# Patient Record
Sex: MALE | Race: WHITE | Employment: FULL TIME | URBAN - METROPOLITAN AREA
[De-identification: names, ages, dates, MRNs, and addresses within clinical notes are randomized per-mention and may not be internally consistent; named-entity substitution may affect disease eponyms.]

---

## 2020-04-13 ENCOUNTER — TRANSFERRED RECORDS (OUTPATIENT)
Dept: HEALTH INFORMATION MANAGEMENT | Facility: CLINIC | Age: 55
End: 2020-04-13

## 2020-04-20 ENCOUNTER — MEDICAL CORRESPONDENCE (OUTPATIENT)
Dept: HEALTH INFORMATION MANAGEMENT | Facility: CLINIC | Age: 55
End: 2020-04-20

## 2020-04-20 ENCOUNTER — TRANSFERRED RECORDS (OUTPATIENT)
Dept: HEALTH INFORMATION MANAGEMENT | Facility: CLINIC | Age: 55
End: 2020-04-20

## 2020-04-20 ENCOUNTER — TRANSCRIBE ORDERS (OUTPATIENT)
Dept: OTHER | Age: 55
End: 2020-04-20

## 2020-04-20 DIAGNOSIS — N35.914 ANTERIOR URETHRAL STRICTURE: Primary | ICD-10-CM

## 2020-04-28 ENCOUNTER — PRE VISIT (OUTPATIENT)
Dept: UROLOGY | Facility: CLINIC | Age: 55
End: 2020-04-28

## 2020-04-28 NOTE — TELEPHONE ENCOUNTER
Reason for visit: urethral stricture consult     Relevant information: history of hypospadias, history of repair, pt has a catheter    Records/imaging/labs/orders: records from [facility] available    Pt called: yes, message left asking pt to call back     At Rooming: telephone visit

## 2020-04-30 NOTE — TELEPHONE ENCOUNTER
MEDICAL RECORDS REQUEST   [facility]  Urology Clinic  [address] [place] [address]  PHONE: [phone]  Fax: [phone]        FUTURE VISIT INFORMATION                                                   Vamshi Ríos, : 1965 scheduled for future visit at [facility]    APPOINTMENT INFORMATION:    Date: 20 12PM    Provider:  Mateo Crouch MD    Reason for Visit/Diagnosis: Anterior US    REFERRAL INFORMATION:    Referring provider:  N/A    Specialty: N/A    Referring providers clinic:  [facility] contact number:  N/A    RECORDS REQUESTED FOR VISIT                                                     NOTES  STATUS/DETAILS   OFFICE NOTE from referring provider  yes   OFFICE NOTE from other specialist  yes   DISCHARGE SUMMARY from hospital  no   DISCHARGE REPORT from the ER  no   OPERATIVE REPORT  yes   MEDICATION LIST  no   LABS     URINALYSIS (UA)  yes   URETHRAL STRICTURE     RUG (IMAGES & REPORT)  no   VCUG  (IMAGES & REPORT)  no     PRE-VISIT CHECKLIST      Record collection complete Yes- [facility] recs in [facility] /Images in [facility] PACS  Fax to  to push images  11:06PM    Appointment appropriately scheduled           (right time/right provider) Yes   MyChart activation If no, please explain; In process    Questionnaire complete If no, please explain: In process      Completed by: Judit Armstrong

## 2020-05-04 ENCOUNTER — DOCUMENTATION ONLY (OUTPATIENT)
Dept: CARE COORDINATION | Facility: CLINIC | Age: 55
End: 2020-05-04

## 2020-05-11 ENCOUNTER — VIRTUAL VISIT (OUTPATIENT)
Dept: UROLOGY | Facility: CLINIC | Age: 55
End: 2020-05-11
Payer: COMMERCIAL

## 2020-05-11 ENCOUNTER — PRE VISIT (OUTPATIENT)
Dept: UROLOGY | Facility: CLINIC | Age: 55
End: 2020-05-11

## 2020-05-11 DIAGNOSIS — N35.914 ANTERIOR URETHRAL STRICTURE: Primary | ICD-10-CM

## 2020-05-11 RX ORDER — LEVOTHYROXINE SODIUM 137 UG/1
TABLET ORAL
COMMUNITY
Start: 2019-06-06

## 2020-05-11 NOTE — NURSING NOTE
Chief Complaint   Patient presents with     Consult For     urethral stricture     Video capability verified.    There were no vitals taken for this visit. There is no height or weight on file to calculate BMI.    There is no problem list on file for this patient.      Allergies   Allergen Reactions     Lidocaine Hives     Ketoconazole      Liver shut down        Current Outpatient Medications   Medication Sig Dispense Refill     levothyroxine (SYNTHROID/LEVOTHROID) 137 MCG tablet TAKE 1 TAB BY MOUTH EVERYMORNING BEFORE BREAKFAST.       Omega-3 Fatty Acids (FISH OIL PO) Take 4,000 mg by mouth         Social History     Tobacco Use     Smoking status: Not on file   Substance Use Topics     Alcohol use: Not on file     Drug use: Not on file       Livia Blackwell CMA  5/11/2020  11:41 AM

## 2020-05-11 NOTE — PROGRESS NOTES
"Vamshi Ríos is a 54 year old male who is being evaluated via a billable video visit.      Vamshi Ríos is a 54 year old man with urethral stricture. He is referred by Dr. Davin Rios who placed an SPT in [place] on 4/29/20 in preparation for urethral reconstruction .     He was born with hypospadias and had multiple reconstructions. He has had episodes of slowing over the years and then 20 years ago had another formal reconstruction which he describes as a 2-stage repair. This was with Antwon Mcmillan at [place]. Has had multiple dilations since that repair. He has been on self dilation with a 14F catheter once a month.  But over the last month he was not able to get the catheter in so a suprapubic was placed in preparation for reconstruction.     He says he has good erections    PMH:  hypothyroidism    PSH:  Urethral reconstruction as above    Current Outpatient Medications   Medication     levothyroxine (SYNTHROID/LEVOTHROID) 137 MCG tablet     Omega-3 Fatty Acids (FISH OIL PO)     No current facility-administered medications for this visit.           Allergies   Allergen Reactions     Lidocaine Hives     Ketoconazole      Liver shut down      SH: works as a  (Social Studies). No smoking. Not .     Exam:  A+O x 3  Moderately obese  Ocular movements intact   SPT in abdomen and moderately obese  Normal motor function throughout  Penis circumcised and there is evidence of ventral scar down to the penoscrotal junction. There is an orthoptic meatus with a thin ventral skin on glans.     We discussed the risks , benefits and alternatives of a 2-stage reconstruction vs. a perineal urethrostomy.  We discussed performing a RUG/VCUG beforehand. I encouraged him to consider the perineal urethrostomy because he has already failed a 2-stage and he is obese.    RCT early June with RUG/VCUG.     The patient has been notified of following:     \"This video visit will be conducted via a call " "between you and your physician/provider. We have found that certain health care needs can be provided without the need for an in-person physical exam.  This service lets us provide the care you need with a video conversation.  If a prescription is necessary we can send it directly to your pharmacy.  If lab work is needed we can place an order for that and you can then stop by our lab to have the test done at a later time.    Video visits are billed at different rates depending on your insurance coverage.  Please reach out to your insurance provider with any questions.    If during the course of the call the physician/provider feels a video visit is not appropriate, you will not be charged for this service.\"    Patient has given verbal consent for Video visit? Yes    How would you like to obtain your AVS? Ashley    Patient would like the video invitation sent by: Send to e-mail at: [email or web address]    Will anyone else be joining your video visit? No      Video-Visit Details    Type of service:  Video Visit    Video Start Time: 12:20  Video End Time: 12:50 PM    Originating Location (pt. Location): Home    Distant Location (provider location):  [facility] UROLOGY AND [facility] FOR PROSTATE AND UROLOGIC CANCERS     Platform used for Video Visit: [facility]  Patient said he never got the [place] email or test invites.       Mateo Crouch MD        "

## 2020-06-06 NOTE — PATIENT INSTRUCTIONS
Please follow up in June with  with a JORGE and VCUG before     It was a pleasure meeting with you today.  Thank you for allowing me and my team the privilege of caring for you today.  YOU are the reason we are here, and I truly hope we provided you with the excellent service you deserve.  Please let us know if there is anything else we can do for you so that we can be sure you are leaving completely satisfied with your care experience.          
normal...
2-3x/week

## 2020-06-17 ENCOUNTER — PRE VISIT (OUTPATIENT)
Dept: UROLOGY | Facility: CLINIC | Age: 55
End: 2020-06-17

## 2020-06-17 NOTE — TELEPHONE ENCOUNTER
Reason for visit: Review RUG/VCUG     Relevant information: urethral stricture    Records/imaging/labs/orders: all appointments scheduled    Pt called: yes, generic message left    At Rooming: flow/pvr

## 2020-06-19 ENCOUNTER — TELEPHONE (OUTPATIENT)
Dept: UROLOGY | Facility: CLINIC | Age: 55
End: 2020-06-19

## 2020-06-19 NOTE — TELEPHONE ENCOUNTER
Patient was notified that per Dr. Mateo Crouch's last clinic note from 5/11/2020 he wants the patient to have a RUG/VCUG and after that he may have to drink water/fluids to try to do a Uroflow/PVR if needed. Patient agreed with the plan.      Shekhar Ochoa MA

## 2020-06-22 ENCOUNTER — ANCILLARY PROCEDURE (OUTPATIENT)
Dept: GENERAL RADIOLOGY | Facility: CLINIC | Age: 55
End: 2020-06-22
Attending: UROLOGY
Payer: COMMERCIAL

## 2020-06-22 ENCOUNTER — ALLIED HEALTH/NURSE VISIT (OUTPATIENT)
Dept: UROLOGY | Facility: CLINIC | Age: 55
End: 2020-06-22
Payer: COMMERCIAL

## 2020-06-22 ENCOUNTER — OFFICE VISIT (OUTPATIENT)
Dept: UROLOGY | Facility: CLINIC | Age: 55
End: 2020-06-22
Payer: COMMERCIAL

## 2020-06-22 ENCOUNTER — HOSPITAL ENCOUNTER (OUTPATIENT)
Facility: AMBULATORY SURGERY CENTER | Age: 55
End: 2020-06-22
Attending: UROLOGY
Payer: COMMERCIAL

## 2020-06-22 DIAGNOSIS — Z46.6 URINARY CATHETER (FOLEY) CHANGE REQUIRED: ICD-10-CM

## 2020-06-22 DIAGNOSIS — Q64.32 CONGENITAL STRICTURE OF URETHRA: Primary | ICD-10-CM

## 2020-06-22 DIAGNOSIS — Z11.59 ENCOUNTER FOR SCREENING FOR OTHER VIRAL DISEASES: Primary | ICD-10-CM

## 2020-06-22 DIAGNOSIS — N35.914 ANTERIOR URETHRAL STRICTURE: ICD-10-CM

## 2020-06-22 DIAGNOSIS — Q64.32 CONGENITAL STRICTURE OF URETHRA: ICD-10-CM

## 2020-06-22 RX ORDER — CEFAZOLIN SODIUM 2 G/50ML
2 SOLUTION INTRAVENOUS
Status: CANCELLED | OUTPATIENT
Start: 2020-06-22

## 2020-06-22 RX ORDER — CIPROFLOXACIN 500 MG/1
500 TABLET, FILM COATED ORAL ONCE
Status: COMPLETED | OUTPATIENT
Start: 2020-06-22 | End: 2020-06-22

## 2020-06-22 RX ORDER — CEFAZOLIN SODIUM 1 G/50ML
1 INJECTION, SOLUTION INTRAVENOUS SEE ADMIN INSTRUCTIONS
Status: CANCELLED | OUTPATIENT
Start: 2020-06-22

## 2020-06-22 RX ORDER — IOPAMIDOL 510 MG/ML
150 INJECTION, SOLUTION INTRAVASCULAR ONCE
Status: COMPLETED | OUTPATIENT
Start: 2020-06-22 | End: 2020-06-22

## 2020-06-22 RX ADMIN — CIPROFLOXACIN 500 MG: 500 TABLET, FILM COATED ORAL at 16:33

## 2020-06-22 RX ADMIN — IOPAMIDOL 250 ML: 510 INJECTION, SOLUTION INTRAVASCULAR at 14:03

## 2020-06-22 ASSESSMENT — PAIN SCALES - GENERAL: PAINLEVEL: NO PAIN (0)

## 2020-06-22 NOTE — NURSING NOTE
Chief Complaint   Patient presents with     Consult For     Stricture       There were no vitals taken for this visit. There is no height or weight on file to calculate BMI.    There is no problem list on file for this patient.      Allergies   Allergen Reactions     Lidocaine Hives     Ketoconazole      Liver shut down        Current Outpatient Medications   Medication Sig Dispense Refill     levothyroxine (SYNTHROID/LEVOTHROID) 137 MCG tablet TAKE 1 TAB BY MOUTH EVERYMORNING BEFORE BREAKFAST.       Omega-3 Fatty Acids (FISH OIL PO) Take 4,000 mg by mouth         Social History     Tobacco Use     Smoking status: Former Smoker     Types: Cigarettes     Smokeless tobacco: Never Used   Substance Use Topics     Alcohol use: Not on file     Drug use: Not on file       Alex Irby, EMT  6/22/2020  2:35 PM

## 2020-06-22 NOTE — LETTER
6/22/2020       RE: Vamshi Ríos  [address] [address]     Dear Colleague,    Thank you for referring your patient, Vamshi Ríos, to the [facility] UROLOGY AND [facility] FOR PROSTATE AND UROLOGIC CANCERS at [facility]. Please see a copy of my visit note below.    Vamshi Ríos is a 55 yo man with hypospadias repair and then 2 stage urethroplasty and now recurrent stricture. He was on self dilation but strictured down despite that. He has also had prior chordee.     At our last video visit we talked about 2-stage repair vs. Perineal urethrostomy.     On exam he has a buried penis and a short penis (due to hypospadias and multiple repairs). There is some moderate ventral skin tethering. There is a cord of scar tissue along the ventral shaft in the course of the urethra. The spongiosum feels more normal in the scrotum.    VCUG shows a normal bulbar urethra and a complete obstruction at the Penoscrotal junction. Bladder is mildly trabeculated. There is reflux of contrast through vas all the way to the right epididymis.     A/P: He elects for perineal urethrostomy. He understands the risks of stenosis and UTIs. He understands the risks to include but not be limited to bleeding, infection, penile or scrotal pain/numbness, change in erectile or ejaculatory function, need for additional procedures and recurrence of primary disease. He wishes to proceed.    As the attending surgeon, I, Mateo Crouch, spent 25 minutes with the patient with >50% in consultation and coordination of care.      Again, thank you for allowing me to participate in the care of your patient.      Sincerely,    Mateo Crouch MD

## 2020-06-22 NOTE — NURSING NOTE
Vamshi PARIKH Ríos comes into clinic today at the request of Dr. Crouch for SPT change.      Chief Complaint   Patient presents with     Consult For     Stricture       Patient Active Problem List   Diagnosis     Congenital stricture of urethra       Allergies   Allergen Reactions     Lidocaine Hives     Ketoconazole      Liver shut down        Current Outpatient Medications   Medication Sig Dispense Refill     levothyroxine (SYNTHROID/LEVOTHROID) 137 MCG tablet TAKE 1 TAB BY MOUTH EVERYMORNING BEFORE BREAKFAST.       Omega-3 Fatty Acids (FISH OIL PO) Take 4,000 mg by mouth         Social History     Tobacco Use     Smoking status: Former Smoker     Types: Cigarettes     Smokeless tobacco: Never Used   Substance Use Topics     Alcohol use: Not on file     Drug use: Not on file       Order has been verified: Yes.    The following medication was given:     MEDICATION:  Ciprofloxacin  ROUTE: PO  SITE: Medication was given orally   DOSE: 500mg  LOT #: [id number]  : Adalberto  EXPIRATION DATE: Mar 2021  NDC#: [address]-88   Was there drug waste? No    Prior to administration, verified patient identity using patient's name and date of birth.    Drug Amount Wasted:  None.  Vial/Syringe: Single dose      Removal:  16 Fr straight tipped latex simon catheter removed from suprapubic meatus without difficulty after removing 7 of fluid from the balloon.    Insertion:  16 Fr straight tipped latex simon catheter inserted into suprapubic meatus in the usual sterile fashion without difficulty.  Balloon filled with 7 mL sterile H2O.  Received 20 ml pink urine output.   Catheter secured in place with leg strap: Yes.     Patient did tolerate procedure well.     Patient instructed as to where to call or go for pain, fever, leakage, or decreased urine flow.     This service provided today was under the direct supervision of Dr. Mateo Crouch, who was available if needed.    Alex Irby, EMT,  6/22/2020  4:04 PM

## 2020-06-22 NOTE — PROGRESS NOTES
Vamshi Ríos is a 53 yo man with hypospadias repair and then 2 stage urethroplasty and now recurrent stricture. He was on self dilation but strictured down despite that. He has also had prior chordee.     At our last video visit we talked about 2-stage repair vs. Perineal urethrostomy.     On exam he has a buried penis and a short penis (due to hypospadias and multiple repairs). There is some moderate ventral skin tethering. There is a cord of scar tissue along the ventral shaft in the course of the urethra. The spongiosum feels more normal in the scrotum.    VCUG shows a normal bulbar urethra and a complete obstruction at the Penoscrotal junction. Bladder is mildly trabeculated. There is reflux of contrast through vas all the way to the right epididymis.     A/P: He elects for perineal urethrostomy. He understands the risks of stenosis and UTIs. He understands the risks to include but not be limited to bleeding, infection, penile or scrotal pain/numbness, change in erectile or ejaculatory function, need for additional procedures and recurrence of primary disease. He wishes to proceed.    As the attending surgeon, IMateo, spent 25 minutes with the patient with >50% in consultation and coordination of care.

## 2020-06-22 NOTE — PROGRESS NOTES
Pre Op Teaching Flowsheet       Pre and Post op Patient Education  Relevant Diagnosis:  Congenital stricture of urethra  Surgical procedure:  CREATION, URETHROSTOMY, PERINEAL   Teaching Topic:  Pre and post op teaching  Person Involved in teaching: Vamshi Ríos    Motivation Level:  Asks Questions: Yes  Eager to Learn:  Yes  Cooperative: Yes  Receptive (willing/able to accept information):  Yes    Patient demonstrates understanding of the following:  Date of surgery:  7/3/20  Location of surgery:  [facility]- 5th Floor  History and Physical and any other testing necessary prior to surgery: Yes  Required time line for completion of History and Physical and any pre-op testing: Yes    Patient demonstrates understanding of the following:  Pre-op bowel prep:  None  Pre-op showering/scrub information with PCMX Soap: Yes  Blood thinner medications discussed and when to stop (if applicable):  Yes      Infection Prevention:   Patient demonstrates understanding of the following:  Surgical procedure site care taught: at time of discharge  Signs and symptoms of infection taught:  Yes      Post-op follow-up:  Discussed how to contact the hospital, nurse, and clinic scheduling staff if necessary.    Instructional materials used/given/mailed:  [facility] Booklet, post op teaching sheet, Map, Soap, and arrival/location information.    Surgical instructions packet given to patient in office:  Yes.    Patient has a  and someone to stay with him for the next 24 hours.

## 2020-06-22 NOTE — PATIENT INSTRUCTIONS
Our team will be contacting you regarding your surgery.     It was a pleasure meeting with you today.  Thank you for allowing me and my team the privilege of caring for you today.  YOU are the reason we are here, and I truly hope we provided you with the excellent service you deserve.  Please let us know if there is anything else we can do for you so that we can be sure you are leaving completely satisfied with your care experience.        Alex Irby, EMT

## 2020-06-29 ENCOUNTER — TELEPHONE (OUTPATIENT)
Dept: UROLOGY | Facility: CLINIC | Age: 55
End: 2020-06-29

## 2020-06-29 NOTE — TELEPHONE ENCOUNTER
[facility] Call Center    Phone Message    May a detailed message be left on voicemail: yes     Reason for Call: Other: pt stated he was going to get his COVID test tomorrow, was that an ok timeframe for his surgery on friday? please call pt thanks     Action Taken: Message routed to:  [facility] urology    Travel Screening: Not Applicable

## 2020-07-06 ENCOUNTER — TELEPHONE (OUTPATIENT)
Dept: UROLOGY | Facility: CLINIC | Age: 55
End: 2020-07-06

## 2020-07-06 NOTE — TELEPHONE ENCOUNTER
"I just remembered that this Pt also mentioned that when he called the \"clinic phone\" listed on his information sheet for the procedure he was told they \"don't take incoming calls on this line\".    Did we mis-print or has something changed and the number is right?    Just passing on the info.  "

## 2020-07-06 NOTE — TELEPHONE ENCOUNTER
[facility] Call Center    Phone Message    May a detailed message be left on voicemail: yes     Reason for Call: Other: Pt called to reschedule whatever procedure/surgery he had cancelled last friday because we could not get his COVID results in time and he was 2 hrs into a 4 hour drive to get here.  His  had to take the day off work.  He did not then and does not know understand why he couldn't do his COVID test on Tuesday instead of Wednesday.     Pt would like us to get our act together and get his procedure scheduled again ASAP and he will be happy to share his frustrations with anyone who doesn't understand.    Please get the Pt set up again and give him a call to schedule ASAP.  Action Taken: Message routed to:  [facility] urology    Travel Screening: Not Applicable

## 2020-07-08 ENCOUNTER — TELEPHONE (OUTPATIENT)
Dept: UROLOGY | Facility: CLINIC | Age: 55
End: 2020-07-08

## 2020-07-08 NOTE — TELEPHONE ENCOUNTER
----- Message from Mateo Crouch MD sent at 7/7/2020 11:19 PM CDT -----  Regarding: canceled surgery  Patient's surgery canceled last Friday because COVID test wasn't done in time. We need to reschedule his surgery but lets wait at least 2 weeks because he also has epididymitis (I got a call from ER about that today). I should have a video or in person visit with him in mid-July to assess the progress of his infection. Please arrange.

## 2020-07-09 ENCOUNTER — PRE VISIT (OUTPATIENT)
Dept: UROLOGY | Facility: CLINIC | Age: 55
End: 2020-07-09

## 2020-07-09 NOTE — TELEPHONE ENCOUNTER
Reason for visit: Discuss infection - epididymitis       Relevant information: surgery had been cancelled due to COVID testing    Records/imaging/labs/orders: records available    Pt called: yes

## 2020-07-13 ENCOUNTER — TELEPHONE (OUTPATIENT)
Dept: UROLOGY | Facility: CLINIC | Age: 55
End: 2020-07-13

## 2020-07-13 ENCOUNTER — VIRTUAL VISIT (OUTPATIENT)
Dept: UROLOGY | Facility: CLINIC | Age: 55
End: 2020-07-13
Payer: COMMERCIAL

## 2020-07-13 DIAGNOSIS — Q64.32 CONGENITAL STRICTURE OF URETHRA: Primary | ICD-10-CM

## 2020-07-13 ASSESSMENT — PAIN SCALES - GENERAL: PAINLEVEL: MILD PAIN (2)

## 2020-07-13 NOTE — PROGRESS NOTES
"Vamshi Ríos is a 54 year old man with severe hypospadias urethral stricture who has SPT. He was scheduled for perineal urethrostomy 2 weeks ago but that was canceled because COVID-19 test results were not back in time.     He is feeling better on the antibiotics. He has another week of antibiotics.   Swelling has gone down. He feels ready for surgery.     PMH/PSH as above    Current Outpatient Medications   Medication     levothyroxine (SYNTHROID/LEVOTHROID) 137 MCG tablet     Omega-3 Fatty Acids (FISH OIL PO)     No current facility-administered medications for this visit.         Allergies   Allergen Reactions     Lidocaine Hives     Ketoconazole      Liver shut down       ROS: 10 point ROS neg other than the symptoms noted above in the HPI.    EXAM:  GENERAL: Healthy, alert and no distress  EYES: Eyes grossly normal to inspection.  No discharge or erythema, or obvious scleral/conjunctival abnormalities.  RESP: No audible wheeze, cough, or visible cyanosis.  No visible retractions or increased work of breathing.    SKIN: Visible skin clear. No significant rash, abnormal pigmentation or lesions.  NEURO: Cranial nerves grossly intact.  Mentation and speech appropriate for age.  PSYCH: Mentation appears normal, affect normal/bright, judgement and insight intact, normal speech and appearance well-groomed.    A/P: We will reschedule the perineal urethrostomy. He will need a urine culture prior. I discussed COVID testing options with him -- he can either go to [place] or [place] [facility] [place] days before surgery.       Vamshi Ríos is a 54 year old male who is being evaluated via a billable video visit.      The patient has been notified of following:     \"This video visit will be conducted via a call between you and your physician/provider. We have found that certain health care needs can be provided without the need for an in-person physical exam.  This service lets us provide the care you need with a video " "conversation.  If a prescription is necessary we can send it directly to your pharmacy.  If lab work is needed we can place an order for that and you can then stop by our lab to have the test done at a later time.    Video visits are billed at different rates depending on your insurance coverage.  Please reach out to your insurance provider with any questions.    If during the course of the call the physician/provider feels a video visit is not appropriate, you will not be charged for this service.\"    Patient has given verbal consent for Video visit? Yes  How would you like to obtain your AVS? MyChart    Will anyone else be joining your video visit? No      Video-Visit Details    Type of service:  Video Visit    Video Start Time: 9:30  Video End Time: 9:49 AM    Originating Location (pt. Location): Home    Distant Location (provider location):  [facility] UROLOGY AND [facility] FOR PROSTATE AND UROLOGIC CANCERS     Platform used for Video Visit: [employer] -- while the [place] video worked, the audio did not    Mateo Crouch MD            Vamshi Ríos is a 54 year old male who is being evaluated via a billable video visit.      The patient has been notified of following:     \"This video visit will be conducted via a call between you and your physician/provider. We have found that certain health care needs can be provided without the need for an in-person physical exam.  This service lets us provide the care you need with a video conversation.  If a prescription is necessary we can send it directly to your pharmacy.  If lab work is needed we can place an order for that and you can then stop by our lab to have the test done at a later time.    Video visits are billed at different rates depending on your insurance coverage.  Please reach out to your insurance provider with any questions.    If during the course of the call the physician/provider feels a video visit is not appropriate, you will not be charged for this " "service.\"    Patient has given verbal consent for Video visit? Yes  How would you like to obtain your AVS? Mail a copy  Patient would like the video invitation sent by: Text to cell phone: [phone]  Will anyone else be joining your video visit? No      Video Visit Technology for this patient: [employer] Video Visit- Patient was left in waiting room  2      "

## 2020-07-13 NOTE — PATIENT INSTRUCTIONS
Schedule surgery with Dr. Crouch.  Will need COVID testing and UC prior to surgery.    It was a pleasure meeting with you today.  Thank you for allowing me and my team the privilege of caring for you today.  YOU are the reason we are here, and I truly hope we provided you with the excellent service you deserve.  Please let us know if there is anything else we can do for you so that we can be sure you are leaving completely satisfied with your care experience.        Christi Chester, CMA

## 2020-07-13 NOTE — LETTER
7/13/2020       RE: Vamshi Ríos  [address] [address]     Dear Colleague,    Thank you for referring your patient, Vamshi Ríos, to the [facility] UROLOGY AND [facility] FOR PROSTATE AND UROLOGIC CANCERS at [facility]. Please see a copy of my visit note below.    Vamshi Ríos is a 54 year old man with severe hypospadias urethral stricture who has SPT. He was scheduled for perineal urethrostomy 2 weeks ago but that was canceled because COVID-19 test results were not back in time.     He is feeling better on the antibiotics. He has another week of antibiotics.   Swelling has gone down. He feels ready for surgery.     PMH/PSH as above    Current Outpatient Medications   Medication     levothyroxine (SYNTHROID/LEVOTHROID) 137 MCG tablet     Omega-3 Fatty Acids (FISH OIL PO)     No current facility-administered medications for this visit.         Allergies   Allergen Reactions     Lidocaine Hives     Ketoconazole      Liver shut down       ROS: 10 point ROS neg other than the symptoms noted above in the HPI.    EXAM:  GENERAL: Healthy, alert and no distress  EYES: Eyes grossly normal to inspection.  No discharge or erythema, or obvious scleral/conjunctival abnormalities.  RESP: No audible wheeze, cough, or visible cyanosis.  No visible retractions or increased work of breathing.    SKIN: Visible skin clear. No significant rash, abnormal pigmentation or lesions.  NEURO: Cranial nerves grossly intact.  Mentation and speech appropriate for age.  PSYCH: Mentation appears normal, affect normal/bright, judgement and insight intact, normal speech and appearance well-groomed.    A/P: We will reschedule the perineal urethrostomy. He will need a urine culture prior. I discussed COVID testing options with him -- he can either go to [place] or come to [facility] 1-2 days before surgery.       Vamshi Ríos is a 54 year old male who is being evaluated via a billable video visit.   "    The patient has been notified of following:     \"This video visit will be conducted via a call between you and your physician/provider. We have found that certain health care needs can be provided without the need for an in-person physical exam.  This service lets us provide the care you need with a video conversation.  If a prescription is necessary we can send it directly to your pharmacy.  If lab work is needed we can place an order for that and you can then stop by our lab to have the test done at a later time.    Video visits are billed at different rates depending on your insurance coverage.  Please reach out to your insurance provider with any questions.    If during the course of the call the physician/provider feels a video visit is not appropriate, you will not be charged for this service.\"    Patient has given verbal consent for Video visit? Yes  How would you like to obtain your AVS? MyChart    Will anyone else be joining your video visit? No      Video-Visit Details    Type of service:  Video Visit    Video Start Time: 9:30  Video End Time: 9:49 AM    Originating Location (pt. Location): Home    Distant Location (provider location):  [facility] UROLOGY AND [facility] FOR PROSTATE AND UROLOGIC CANCERS     Platform used for Video Visit: [employer] -- while the [employer] video worked, the audio did not    Mateo Crouch MD            Vamshi Ríos is a 54 year old male who is being evaluated via a billable video visit.      The patient has been notified of following:     \"This video visit will be conducted via a call between you and your physician/provider. We have found that certain health care needs can be provided without the need for an in-person physical exam.  This service lets us provide the care you need with a video conversation.  If a prescription is necessary we can send it directly to your pharmacy.  If lab work is needed we can place an order for that and you can then stop by our lab to have " "the test done at a later time.    Video visits are billed at different rates depending on your insurance coverage.  Please reach out to your insurance provider with any questions.    If during the course of the call the physician/provider feels a video visit is not appropriate, you will not be charged for this service.\"    Patient has given verbal consent for Video visit? Yes  How would you like to obtain your AVS? Mail a copy  Patient would like the video invitation sent by: Text to cell phone: [phone]  Will anyone else be joining your video visit? No      Video Visit Technology for this patient: [employer] Video Visit- Patient was left in waiting room  2          "

## 2020-07-14 DIAGNOSIS — Q64.32 CONGENITAL STRICTURE OF URETHRA: Primary | ICD-10-CM

## 2020-07-14 RX ORDER — CEFAZOLIN SODIUM 1 G/50ML
1 INJECTION, SOLUTION INTRAVENOUS SEE ADMIN INSTRUCTIONS
Status: CANCELLED | OUTPATIENT
Start: 2020-07-14

## 2020-07-14 RX ORDER — CEFAZOLIN SODIUM 2 G/50ML
2 SOLUTION INTRAVENOUS
Status: CANCELLED | OUTPATIENT
Start: 2020-07-14

## 2020-07-20 ENCOUNTER — TELEPHONE (OUTPATIENT)
Dept: UROLOGY | Facility: CLINIC | Age: 55
End: 2020-07-20

## 2020-07-20 DIAGNOSIS — Z11.59 ENCOUNTER FOR SCREENING FOR OTHER VIRAL DISEASES: Primary | ICD-10-CM

## 2020-07-20 NOTE — TELEPHONE ENCOUNTER
[facility] Call Center    Phone Message    May a detailed message be left on voicemail: yes     Reason for Call: Other: Vamshi calling in to request a call back to schedule his surgery that he spoke with Dr. Crouch about during his appointment on 7/13/20. Vamshi is a little frustrated, because Dr. Crouch told him he would hear back before 7/15/20, and Vamshi didn't receive a call. Vamshi said his perineal urethrostomy has already been cancelled before, because his COVID-19 test results had not come in yet. Please give Vamshi a call back to discuss his surgery scheduling at your earliest convenience.     Action Taken: Message routed to:  [facility] ([facility]  Uro    Travel Screening: Not Applicable

## 2020-07-20 NOTE — TELEPHONE ENCOUNTER
Patient is scheduled for surgery with Dr. Miko Bonds    Date of Surgery: 7/24/20    Location: [facility] OR    Informed patient they will need an adult  yes    H&P: Scheduled with already done    Additional imaging/appointments: n/a    Surgery packet: already has it     Additional comments: informed to get covid test within 72 hour of surgery

## 2020-07-21 ENCOUNTER — TELEPHONE (OUTPATIENT)
Dept: UROLOGY | Facility: CLINIC | Age: 55
End: 2020-07-21

## 2020-07-21 NOTE — TELEPHONE ENCOUNTER
----- Message from Bren Baez RN sent at 7/21/2020  2:38 PM CDT -----  Regarding: FW: teaching  Hi Livia,    Can you please let him know that he needs a urine culture tomorrow when he has his covid test at the [facility] location. The orders are in Epic. He will want to call the [place] lab to make sure the know he's coming a lab appointment as well as the covid. (the covid might just be a drive through, I don't know forsure)  His last urine was not good.    Thanks for your help with this one.  ----- Message -----  From: Marta Gaona  Sent: 7/20/2020   2:15 PM CDT  To: Bren Baez RN  Subject: teaching                                         Miko    CREATION, URETHROSTOMY, PERINEAL (N/A)     Surgery scheduled on 7/24/20 @ [facility] OR    Patient informed, patient already has a packet, please call for teaching    Marta

## 2020-07-21 NOTE — TELEPHONE ENCOUNTER
Detailed message left asking pt to drop off a urine sample tomorrow 7/22/2020.     RN CC notified.

## 2020-07-22 DIAGNOSIS — Z11.59 ENCOUNTER FOR SCREENING FOR OTHER VIRAL DISEASES: ICD-10-CM

## 2020-07-22 DIAGNOSIS — Q64.32 CONGENITAL STRICTURE OF URETHRA: ICD-10-CM

## 2020-07-22 LAB
ALBUMIN UR-MCNC: NEGATIVE MG/DL
APPEARANCE UR: CLEAR
BILIRUB UR QL STRIP: NEGATIVE
COLOR UR AUTO: YELLOW
GLUCOSE UR STRIP-MCNC: NEGATIVE MG/DL
HGB UR QL STRIP: ABNORMAL
KETONES UR STRIP-MCNC: NEGATIVE MG/DL
LEUKOCYTE ESTERASE UR QL STRIP: ABNORMAL
MUCOUS THREADS #/AREA URNS LPF: PRESENT /LPF
NITRATE UR QL: NEGATIVE
PH UR STRIP: 6 PH (ref 5–7)
RBC #/AREA URNS AUTO: 8 /HPF (ref 0–2)
SOURCE: ABNORMAL
SP GR UR STRIP: 1.01 (ref 1–1.03)
UROBILINOGEN UR STRIP-MCNC: 0 MG/DL (ref 0–2)
WBC #/AREA URNS AUTO: 27 /HPF (ref 0–5)

## 2020-07-22 PROCEDURE — U0003 INFECTIOUS AGENT DETECTION BY NUCLEIC ACID (DNA OR RNA); SEVERE ACUTE RESPIRATORY SYNDROME CORONAVIRUS 2 (SARS-COV-2) (CORONAVIRUS DISEASE [COVID-19]), AMPLIFIED PROBE TECHNIQUE, MAKING USE OF HIGH THROUGHPUT TECHNOLOGIES AS DESCRIBED BY CMS-2020-01-R: HCPCS | Performed by: UROLOGY

## 2020-07-22 PROCEDURE — 87086 URINE CULTURE/COLONY COUNT: CPT

## 2020-07-22 PROCEDURE — 81001 URINALYSIS AUTO W/SCOPE: CPT

## 2020-07-23 ENCOUNTER — ANESTHESIA EVENT (OUTPATIENT)
Dept: SURGERY | Facility: AMBULATORY SURGERY CENTER | Age: 55
End: 2020-07-23

## 2020-07-23 LAB
BACTERIA SPEC CULT: NO GROWTH
Lab: NORMAL
SARS-COV-2 RNA SPEC QL NAA+PROBE: NOT DETECTED
SPECIMEN SOURCE: NORMAL
SPECIMEN SOURCE: NORMAL

## 2020-07-24 ENCOUNTER — HOSPITAL ENCOUNTER (OUTPATIENT)
Facility: AMBULATORY SURGERY CENTER | Age: 55
End: 2020-07-24
Attending: UROLOGY
Payer: COMMERCIAL

## 2020-07-24 ENCOUNTER — ANESTHESIA (OUTPATIENT)
Dept: SURGERY | Facility: AMBULATORY SURGERY CENTER | Age: 55
End: 2020-07-24

## 2020-07-24 VITALS
TEMPERATURE: 98 F | HEIGHT: 68 IN | DIASTOLIC BLOOD PRESSURE: 68 MMHG | BODY MASS INDEX: 32.58 KG/M2 | HEART RATE: 95 BPM | WEIGHT: 215 LBS | SYSTOLIC BLOOD PRESSURE: 105 MMHG | RESPIRATION RATE: 12 BRPM | OXYGEN SATURATION: 98 %

## 2020-07-24 DIAGNOSIS — Q64.32 CONGENITAL STRICTURE OF URETHRA: ICD-10-CM

## 2020-07-24 RX ORDER — KETOROLAC TROMETHAMINE 30 MG/ML
INJECTION, SOLUTION INTRAMUSCULAR; INTRAVENOUS PRN
Status: DISCONTINUED | OUTPATIENT
Start: 2020-07-24 | End: 2020-07-24

## 2020-07-24 RX ORDER — PROPOFOL 10 MG/ML
INJECTION, EMULSION INTRAVENOUS PRN
Status: DISCONTINUED | OUTPATIENT
Start: 2020-07-24 | End: 2020-07-24

## 2020-07-24 RX ORDER — FENTANYL CITRATE 50 UG/ML
25-50 INJECTION, SOLUTION INTRAMUSCULAR; INTRAVENOUS
Status: DISCONTINUED | OUTPATIENT
Start: 2020-07-24 | End: 2020-07-24 | Stop reason: HOSPADM

## 2020-07-24 RX ORDER — GABAPENTIN 300 MG/1
300 CAPSULE ORAL ONCE
Status: COMPLETED | OUTPATIENT
Start: 2020-07-24 | End: 2020-07-24

## 2020-07-24 RX ORDER — ONDANSETRON 2 MG/ML
INJECTION INTRAMUSCULAR; INTRAVENOUS PRN
Status: DISCONTINUED | OUTPATIENT
Start: 2020-07-24 | End: 2020-07-24

## 2020-07-24 RX ORDER — ACETAMINOPHEN 325 MG/1
975 TABLET ORAL ONCE
Status: COMPLETED | OUTPATIENT
Start: 2020-07-24 | End: 2020-07-24

## 2020-07-24 RX ORDER — OXYCODONE HYDROCHLORIDE 5 MG/1
5-10 TABLET ORAL EVERY 4 HOURS PRN
Status: DISCONTINUED | OUTPATIENT
Start: 2020-07-24 | End: 2020-07-25 | Stop reason: HOSPADM

## 2020-07-24 RX ORDER — CEFAZOLIN SODIUM 1 G/50ML
1 SOLUTION INTRAVENOUS SEE ADMIN INSTRUCTIONS
Status: DISCONTINUED | OUTPATIENT
Start: 2020-07-24 | End: 2020-07-24 | Stop reason: HOSPADM

## 2020-07-24 RX ORDER — ONDANSETRON 2 MG/ML
4 INJECTION INTRAMUSCULAR; INTRAVENOUS EVERY 30 MIN PRN
Status: DISCONTINUED | OUTPATIENT
Start: 2020-07-24 | End: 2020-07-25 | Stop reason: HOSPADM

## 2020-07-24 RX ORDER — PROPOFOL 10 MG/ML
INJECTION, EMULSION INTRAVENOUS CONTINUOUS PRN
Status: DISCONTINUED | OUTPATIENT
Start: 2020-07-24 | End: 2020-07-24

## 2020-07-24 RX ORDER — ALBUTEROL SULFATE 0.83 MG/ML
2.5 SOLUTION RESPIRATORY (INHALATION) EVERY 4 HOURS PRN
Status: DISCONTINUED | OUTPATIENT
Start: 2020-07-24 | End: 2020-07-24 | Stop reason: HOSPADM

## 2020-07-24 RX ORDER — MEPERIDINE HYDROCHLORIDE 25 MG/ML
12.5 INJECTION INTRAMUSCULAR; INTRAVENOUS; SUBCUTANEOUS
Status: DISCONTINUED | OUTPATIENT
Start: 2020-07-24 | End: 2020-07-25 | Stop reason: HOSPADM

## 2020-07-24 RX ORDER — SODIUM CHLORIDE, SODIUM LACTATE, POTASSIUM CHLORIDE, CALCIUM CHLORIDE 600; 310; 30; 20 MG/100ML; MG/100ML; MG/100ML; MG/100ML
INJECTION, SOLUTION INTRAVENOUS CONTINUOUS
Status: DISCONTINUED | OUTPATIENT
Start: 2020-07-24 | End: 2020-07-24 | Stop reason: HOSPADM

## 2020-07-24 RX ORDER — ONDANSETRON 4 MG/1
4 TABLET, ORALLY DISINTEGRATING ORAL EVERY 30 MIN PRN
Status: DISCONTINUED | OUTPATIENT
Start: 2020-07-24 | End: 2020-07-25 | Stop reason: HOSPADM

## 2020-07-24 RX ORDER — FENTANYL CITRATE 50 UG/ML
25-50 INJECTION, SOLUTION INTRAMUSCULAR; INTRAVENOUS
Status: DISCONTINUED | OUTPATIENT
Start: 2020-07-24 | End: 2020-07-25 | Stop reason: HOSPADM

## 2020-07-24 RX ORDER — DEXAMETHASONE SODIUM PHOSPHATE 4 MG/ML
4 INJECTION, SOLUTION INTRA-ARTICULAR; INTRALESIONAL; INTRAMUSCULAR; INTRAVENOUS; SOFT TISSUE EVERY 10 MIN PRN
Status: DISCONTINUED | OUTPATIENT
Start: 2020-07-24 | End: 2020-07-25 | Stop reason: HOSPADM

## 2020-07-24 RX ORDER — SODIUM CHLORIDE, SODIUM LACTATE, POTASSIUM CHLORIDE, CALCIUM CHLORIDE 600; 310; 30; 20 MG/100ML; MG/100ML; MG/100ML; MG/100ML
INJECTION, SOLUTION INTRAVENOUS CONTINUOUS PRN
Status: DISCONTINUED | OUTPATIENT
Start: 2020-07-24 | End: 2020-07-24

## 2020-07-24 RX ORDER — DEXAMETHASONE SODIUM PHOSPHATE 4 MG/ML
INJECTION, SOLUTION INTRA-ARTICULAR; INTRALESIONAL; INTRAMUSCULAR; INTRAVENOUS; SOFT TISSUE PRN
Status: DISCONTINUED | OUTPATIENT
Start: 2020-07-24 | End: 2020-07-24

## 2020-07-24 RX ORDER — GLYCOPYRROLATE 0.2 MG/ML
INJECTION, SOLUTION INTRAMUSCULAR; INTRAVENOUS PRN
Status: DISCONTINUED | OUTPATIENT
Start: 2020-07-24 | End: 2020-07-24

## 2020-07-24 RX ORDER — KETOROLAC TROMETHAMINE 30 MG/ML
30 INJECTION, SOLUTION INTRAMUSCULAR; INTRAVENOUS EVERY 6 HOURS PRN
Status: DISCONTINUED | OUTPATIENT
Start: 2020-07-24 | End: 2020-07-25 | Stop reason: HOSPADM

## 2020-07-24 RX ORDER — NALOXONE HYDROCHLORIDE 0.4 MG/ML
.1-.4 INJECTION, SOLUTION INTRAMUSCULAR; INTRAVENOUS; SUBCUTANEOUS
Status: DISCONTINUED | OUTPATIENT
Start: 2020-07-24 | End: 2020-07-25 | Stop reason: HOSPADM

## 2020-07-24 RX ORDER — OXYCODONE HYDROCHLORIDE 5 MG/1
5 TABLET ORAL EVERY 6 HOURS PRN
Qty: 12 TABLET | Refills: 0 | Status: SHIPPED | OUTPATIENT
Start: 2020-07-24 | End: 2020-11-16

## 2020-07-24 RX ORDER — FENTANYL CITRATE 50 UG/ML
INJECTION, SOLUTION INTRAMUSCULAR; INTRAVENOUS PRN
Status: DISCONTINUED | OUTPATIENT
Start: 2020-07-24 | End: 2020-07-24

## 2020-07-24 RX ORDER — CEFAZOLIN SODIUM 2 G/50ML
2 SOLUTION INTRAVENOUS
Status: COMPLETED | OUTPATIENT
Start: 2020-07-24 | End: 2020-07-24

## 2020-07-24 RX ORDER — LIDOCAINE HYDROCHLORIDE 20 MG/ML
INJECTION, SOLUTION INFILTRATION; PERINEURAL PRN
Status: DISCONTINUED | OUTPATIENT
Start: 2020-07-24 | End: 2020-07-24

## 2020-07-24 RX ORDER — SODIUM CHLORIDE, SODIUM LACTATE, POTASSIUM CHLORIDE, CALCIUM CHLORIDE 600; 310; 30; 20 MG/100ML; MG/100ML; MG/100ML; MG/100ML
INJECTION, SOLUTION INTRAVENOUS CONTINUOUS
Status: DISCONTINUED | OUTPATIENT
Start: 2020-07-24 | End: 2020-07-25 | Stop reason: HOSPADM

## 2020-07-24 RX ORDER — HYDROMORPHONE HYDROCHLORIDE 1 MG/ML
.3-.5 INJECTION, SOLUTION INTRAMUSCULAR; INTRAVENOUS; SUBCUTANEOUS EVERY 10 MIN PRN
Status: DISCONTINUED | OUTPATIENT
Start: 2020-07-24 | End: 2020-07-25 | Stop reason: HOSPADM

## 2020-07-24 RX ADMIN — SODIUM CHLORIDE, SODIUM LACTATE, POTASSIUM CHLORIDE, CALCIUM CHLORIDE: 600; 310; 30; 20 INJECTION, SOLUTION INTRAVENOUS at 11:21

## 2020-07-24 RX ADMIN — CEFAZOLIN SODIUM 2 G: 2 SOLUTION INTRAVENOUS at 11:33

## 2020-07-24 RX ADMIN — PROPOFOL: 10 INJECTION, EMULSION INTRAVENOUS at 12:05

## 2020-07-24 RX ADMIN — LIDOCAINE HYDROCHLORIDE 100 MG: 20 INJECTION, SOLUTION INFILTRATION; PERINEURAL at 11:26

## 2020-07-24 RX ADMIN — PROPOFOL: 10 INJECTION, EMULSION INTRAVENOUS at 13:07

## 2020-07-24 RX ADMIN — SODIUM CHLORIDE, SODIUM LACTATE, POTASSIUM CHLORIDE, CALCIUM CHLORIDE: 600; 310; 30; 20 INJECTION, SOLUTION INTRAVENOUS at 10:30

## 2020-07-24 RX ADMIN — CEFAZOLIN SODIUM 1 G: 2 SOLUTION INTRAVENOUS at 13:27

## 2020-07-24 RX ADMIN — Medication 100 MCG: at 12:18

## 2020-07-24 RX ADMIN — PROPOFOL 200 MG: 10 INJECTION, EMULSION INTRAVENOUS at 11:26

## 2020-07-24 RX ADMIN — ACETAMINOPHEN 975 MG: 325 TABLET ORAL at 10:20

## 2020-07-24 RX ADMIN — ONDANSETRON 4 MG: 2 INJECTION INTRAMUSCULAR; INTRAVENOUS at 11:26

## 2020-07-24 RX ADMIN — DEXAMETHASONE SODIUM PHOSPHATE 4 MG: 4 INJECTION, SOLUTION INTRA-ARTICULAR; INTRALESIONAL; INTRAMUSCULAR; INTRAVENOUS; SOFT TISSUE at 11:26

## 2020-07-24 RX ADMIN — Medication 0.5 MG: at 13:50

## 2020-07-24 RX ADMIN — GABAPENTIN 300 MG: 300 CAPSULE ORAL at 10:19

## 2020-07-24 RX ADMIN — KETOROLAC TROMETHAMINE 30 MG: 30 INJECTION, SOLUTION INTRAMUSCULAR; INTRAVENOUS at 13:43

## 2020-07-24 RX ADMIN — PROPOFOL 150 MCG/KG/MIN: 10 INJECTION, EMULSION INTRAVENOUS at 11:26

## 2020-07-24 RX ADMIN — FENTANYL CITRATE 100 MCG: 50 INJECTION, SOLUTION INTRAMUSCULAR; INTRAVENOUS at 11:33

## 2020-07-24 RX ADMIN — Medication 0.5 MG: at 12:40

## 2020-07-24 RX ADMIN — Medication 100 MCG: at 13:14

## 2020-07-24 RX ADMIN — GLYCOPYRROLATE 0.2 MG: 0.2 INJECTION, SOLUTION INTRAMUSCULAR; INTRAVENOUS at 11:52

## 2020-07-24 ASSESSMENT — LIFESTYLE VARIABLES: TOBACCO_USE: 1

## 2020-07-24 ASSESSMENT — MIFFLIN-ST. JEOR: SCORE: 1794.73

## 2020-07-24 NOTE — ANESTHESIA CARE TRANSFER NOTE
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    Anesthesia Type:   General     Note:    Patient transferred to:PACU  Comments: VSS/WNL. Resp adeq with OAW in place.Handoff Report: Identifed the Patient, Identified the Reponsible Provider, Reviewed the pertinent medical history, Discussed the surgical course, Reviewed Intra-OP anesthesia mangement and issues during anesthesia, Set expectations for post-procedure period and Allowed opportunity for questions and acknowledgement of understanding      Vitals: (Last set prior to Anesthesia Care Transfer)    CRNA VITALS  7/24/2020 1331 - 7/24/2020 1409      7/24/2020             Pulse:  73    SpO2:  98 %    Resp Rate (observed):  9                Electronically Signed By: FRANCISCO Johnston CRNA  July 24, 2020  2:09 PM

## 2020-07-24 NOTE — ANESTHESIA PREPROCEDURE EVALUATION
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]   Procedure(s):  CREATION, URETHROSTOMY, PERINEAL     LABS:  CBC: No results found for: WBC, HGB, HCT, PLT  BMP: No results found for: NA, POTASSIUM, CHLORIDE, CO2, BUN, CR, GLC  COAGS: No results found for: PTT, INR, FIBR  POC: No results found for: BGM, HCG, HCGS  OTHER: No results found for: PH, LACT, A1C, DINORAH, PHOS, MAG, ALBUMIN, PROTTOTAL, ALT, AST, GGT, ALKPHOS, BILITOTAL, BILIDIRECT, LIPASE, AMYLASE, NORM, TSH, T4, T3, CRP, SED     Preop Vitals    BP Readings from Last 3 Encounters:   20 (!) 132/96    Pulse Readings from Last 3 Encounters:   20 69      Resp Readings from Last 3 Encounters:   20 16    SpO2 Readings from Last 3 Encounters:   20 99%      Temp Readings from Last 1 Encounters:   20 36.7  C (98  F) (Oral)    Ht Readings from Last 1 Encounters:   20 1.727 m (5' 8\")      Wt Readings from Last 1 Encounters:   20 97.5 kg (215 lb)    Estimated body mass index is 32.69 kg/m  as calculated from the following:    Height as of this encounter: 1.727 m (5' 8\").    Weight as of this encounter: 97.5 kg (215 lb).     LDA:  Peripheral IV 20 Right Hand (Active)   Site Assessment WDL 20 1029   Line Status Infusing 20 1029   Phlebitis Scale 0-->no symptoms 20 1029   Infiltration Scale 0 20 1029   Dressing Intervention New dressing  20 1029   Number of days: 0       Airway - Adult/Peds laryngeal mask airway (Active)   Number of days: 0       Airway - Adult/Peds laryngeal mask airway (Active)   Number of days: 0        Past Medical History:   Diagnosis Date     MARYANA (obstructive sleep apnea)       History reviewed. No pertinent surgical history.   Allergies   Allergen Reactions     Lidocaine Hives     Ketoconazole      Liver shut " down         Anesthesia Evaluation     . Pt has had prior anesthetic. Type: General    No history of anesthetic complications          ROS/MED HX    ENT/Pulmonary:     (+)sleep apnea, tobacco use, Past use uses CPAP , . .    Neurologic:  - neg neurologic ROS     Cardiovascular:  - neg cardiovascular ROS       METS/Exercise Tolerance:  4 - Raking leaves, gardening   Hematologic:  - neg hematologic  ROS       Musculoskeletal:  - neg musculoskeletal ROS       GI/Hepatic:  - neg GI/hepatic ROS       Renal/Genitourinary:  - ROS Renal section negative       Endo:  - neg endo ROS       Psychiatric:  - neg psychiatric ROS       Infectious Disease:  - neg infectious disease ROS       Malignancy:      - no malignancy   Other:                         PHYSICAL EXAM:   Mental Status/Neuro: A/A/O   Airway: Facies: Feasible  Mallampati: I  Mouth/Opening: Full  TM distance: > 6 cm  Neck ROM: Full   Respiratory: Auscultation: CTAB     Resp. Rate: Normal     Resp. Effort: Normal      CV: Rhythm: Regular  Rate: Age appropriate  Heart: Normal Sounds  Edema: None   Comments:      Dental: Normal Dentition                Assessment:   ASA SCORE: 2    H&P: History and physical reviewed and following examination; no interval change.   Smoking Status:  Non-Smoker/Unknown   NPO Status: NPO Appropriate     Plan:   Anes. Type:  General   Pre-Medication: None   Induction:  IV (Standard)   Airway: LMA   Access/Monitoring: PIV   Maintenance: TIVA     Postop Plan:   Postop Pain: Opioids  Postop Sedation/Airway: Not planned  Disposition: Outpatient     PONV Management:   Adult Risk Factors:, Non-Smoker, Postop Opioids   Prevention: Ondansetron, Dexamethasone, No Volatiles     CONSENT: Direct conversation   Plan and risks discussed with: Patient                      Yemi Nuñez MD, MD

## 2020-07-24 NOTE — DISCHARGE INSTRUCTIONS
[facility]  Home Care Following Anesthesia  For 24 hours after surgery:  1. Get plenty of rest.  A responsible adult must stay with you for at least 24 hours after you leave the surgery center.  2. Do not drive or use heavy equipment.  If you have weakness or tingling, don't drive or use heavy equipment until this feeling goes away.   3. Do not drink alcohol.   4. Avoid strenuous or risky activities.  Ask for help when climbing stairs.  5. You may feel lightheaded.  IF so, sit for a few minutes before standing.  Have someone help you get up.   6. If you have nausea (feel sick to your stomach): Drink only clear liquids such as apple juice, ginger ale, broth or 7-Up.  Rest may also help.  Be sure to drink enough fluids.  Move to a regular diet as you feel able.   7. You may have a slight fever.  Call the doctor if your fever is over 100 F (37.7 C) (taken under the tongue) or lasts longer than 24 hours.  8. You may have a dry mouth, a sore throat, muscle aches or trouble sleeping. These should go away after 24 hours.  9. Do not make important or legal decisions.               Tips for taking pain medications  To get the best pain relief possible, remember these points:    Take pain medications as directed, before pain becomes severe.    Pain medication can upset your stomach: taking it with food may help.    Constipation is a common side effect of pain medication. Drink plenty of  fluids.    Eat foods high in fiber. Take a stool softener if recommended by your doctor or pharmacist.    Do not drink alcohol, drive or operate machinery while taking pain medications.    Ask about other ways to control pain, such as with heat, ice or relaxation.    Tylenol/Acetaminophen Consumption  To help encourage the safe use of acetaminophen, the makers of TYLENOL  have lowered the maximum daily dose for single-ingredient Extra Strength TYLENOL  (acetaminophen) products sold in the [place] from 8  pills per day (4,000 mg) to 6 pills per day (3,000 mg). The dosing interval has also changed from 2 pills every 4-6 hours to 2 pills every 6 hours.    If you feel your pain relief is insufficient, you may take Tylenol/Acetaminophen in addition to your narcotic pain medication.     Be careful not to exceed 3,000 mg of Tylenol/Acetaminophen in a 24 hour period from all sources.    If you are taking extra strength Tylenol/acetaminophen (500 mg), the maximum dose is 6 tablets in 24 hours.    If you are taking regular strength acetaminophen (325 mg), the maximum dose is 9 tablets in 24 hours.    Call a doctor for any of the followin. Signs of infection (fever, growing tenderness at the surgery site, a large amount of drainage or bleeding, severe pain, foul-smelling drainage, redness, swelling).  2. It has been over 8 to 10 hours since surgery and you are still not able to urinate (pass water).  3. Headache for over 24 hours.  4. Numbness, tingling or weakness the day after surgery (if you had spinal anesthesia).  5. Signs of Covid-19 infection (temperature over 100 degrees, shortness of breath, cough, loss of taste/smell, generalized body aches, persistent headache, chills, sore throat, nausea/vomiting/diarrhea)  Your doctor is:  Dr. Mateo Sanchez, Prostate and Urology: [phone]                    Or dial [phone] and ask for the resident on call for:  Prostate Urology  For emergency care, call the:  [place] [facility]:  [phone] (TTY for hearing impaired: [phone])

## 2020-07-24 NOTE — BRIEF OP NOTE
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  Complications: None.  Implants: * No implants in log *

## 2020-07-24 NOTE — ANESTHESIA POSTPROCEDURE EVALUATION
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     Anesthesia Type: General       Disposition: Outpatient   Postop Pain Control: Uneventful            Sign Out: Well controlled pain   PONV: No   Neuro/Psych: Uneventful            Sign Out: Acceptable/Baseline neuro status   Airway/Respiratory: Uneventful            Sign Out: Acceptable/Baseline resp. status   CV/Hemodynamics: Uneventful            Sign Out: Acceptable CV status   Other NRE: NONE   DID A NON-ROUTINE EVENT OCCUR? No         Last Anesthesia Record Vitals:  CRNA VITALS  2020 1331 - 2020 1431      2020             Pulse:  73    SpO2:  98 %    Resp Rate (observed):  9          Last PACU Vitals:  Vitals Value Taken Time   /68 2020  3:15 PM   Temp 36.7  C (98  F) 2020  3:15 PM   Pulse 95 2020  3:15 PM   Resp 13 2020  3:15 PM   SpO2 97 % 2020  3:20 PM   Temp src     NIBP     Pulse     SpO2     Resp     Temp     Ht Rate     Temp 2     Vitals shown include unvalidated device data.      Electronically Signed By: Sierra Cuba MD, 2020, 3:43 PM

## 2020-07-24 NOTE — OR NURSING
Discharge instructions reviewed with Esdras arnett via phone. No questions or concerns at this time.

## 2020-07-25 NOTE — OP NOTE
PREOPERATIVE DIAGNOSIS:    1. Urethral stricture  2. Congenital hypospadias     POSTOPERATIVE DIAGNOSIS:   1. Urethral stricture   2. Congenital hypospadias     PROCEDURES PERFORMED:   1. First stage urethroplasty  2. Cystoscopy    STAFF SURGEON: Mateo Crouch MD     FELLOW: Slim Patterson, PGY-7    RESIDENT(S):  Paulo Abraham, PGY-4    ANESTHESIA: General    ESTIMATED BLOOD LOSS: 200 mL.     DRAINS: None     SIGNIFICANT FINDINGS: urethra was significantly scarred to perineum. There was minimal fascia and no overlying muscle suggestive of previous perineal surgeries     SPECIMEN(S): none    OPERATIVE INDICATIONS:   Vamshi Ríos is a 54-year-old man with a previous hypospadias repair and then 2 stage urethroplasty and now recurrent stricture. He was on self-dilation but strictured down despite that. He has also had prior chordee. He went into urinary retention and now has a SPT. VCUG shows a normal bulbar urethra and a complete obstruction at the penoscrotal junction. He was counseled on options including reconstruction with urethroplasty versus perineal urethrostomy and elected to proceed with perineal urethrostomy after discussion of risks and benefits.  He understands the risks to include but not be limited to bleeding, infection, penile or scrotal pain/numbness, change in erectile or ejaculatory function, need for additional procedures and recurrence of primary disease.       DESCRIPTION OF PROCEDURE:   After verification of informed consent was obtained, the patient was brought to the operating room, and moved to the operating table. After adequate anesthesia was induced, the patient was repositioned in the high lithotomy position and prepped and draped in the usual sterile fashion. A formal timeout was performed to confirm the correct patient, procedure and operative site.      A vertical midline incision was made in the perineum and carried down through the Ximena's fascia. During this dissection diffuse  bleeding was noted at the midline and it was evident that we had transected the corpus spongiosum. We identified the lumen of the urethra and confirmed this by inserting a 20-Fr bougie which entered the bladder easily. The corpus spongiosum was then carefully reflected off the bulbospongiosus muscles laterally. Of note, the spongiosal tissue was very splayed and adhered to the bulbospongiosus muscles. Meticulous hemostasis was obtained.     At this point, we turned our attention to the previous urethrotomy, 4-0 Vicryl stitches were placed at 3 and 9 o'clock positions on the urethra in a full thickness manner and the urethrotomy was carried to the mid-bulb. We then introduced the cystoscope into the proximal urethral edge and found 2 centimeters of normal urethra distal to the external sphincter. No veru was visible at the prostate and the bladder neck was high. The bladder was entered and showed diffuse inflammatory changes likely secondary to his chronic catheter. The cystoscope was removed.     Distally the lateral edges of our midline skin incision were sewn in a running fashion to the outer aspect of the adventitia of the corpus spongiosum and into the urethral epithelium. This was done in a running fashion with 4-0 Vicryl. The proximal most aspect of the urethrotomy was then anastomosed to the posterior skin in a similar fashion with interrupted 4-0 Vicryl sutures. The posterior aspect of our perineal incision which was not anastomosed to the urethra was then closed primarily using interrupted 4-0 monocryl mattress sutures. Bacitracin, fluffs and scrotal support were placed. The patient was awaken from anesthesia and transferred to the PACU in stable condition.

## 2020-07-29 ENCOUNTER — PRE VISIT (OUTPATIENT)
Dept: UROLOGY | Facility: CLINIC | Age: 55
End: 2020-07-29

## 2020-07-29 NOTE — TELEPHONE ENCOUNTER
Visit Type : Post Op-Clinic    Hx/Sx: s/p URETHROSTOMY, PERINEAL creation due to stricture     Records/Orders: yes    Pt Contacted: n/a    At Rooming: Normal

## 2020-08-03 ENCOUNTER — OFFICE VISIT (OUTPATIENT)
Dept: UROLOGY | Facility: CLINIC | Age: 55
End: 2020-08-03
Payer: COMMERCIAL

## 2020-08-03 DIAGNOSIS — Q64.32 CONGENITAL STRICTURE OF URETHRA: Primary | ICD-10-CM

## 2020-08-03 ASSESSMENT — PAIN SCALES - GENERAL: PAINLEVEL: NO PAIN (0)

## 2020-08-03 NOTE — PROGRESS NOTES
Clinic Visit Note    Vamshi Ríos is a 54-year-old man with a previous hypospadias repair and then 2 stage urethroplasty and now recurrent stricture. He was on self-dilation but strictured down with urinary retention requiring SPT. 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 He presents today for post-op follow up. He has been doing well since the surgery. Does note voiding frequency since surgery which seems to be improving. No dysuria, fevers, chills or suprapubic pain. He has noticed some drops of blood occasionally when wiping. No other issues when urinating. Pain is well controlled. He is having normal BMs.       Vital signs reviewed    Exam:  No tenderness or evidence of cellulitis  No hematoma  Perineum urostomy well healing, with 2mm areas at 2 and 5 oclock with skin dehiscence from stoma.   SPT site well healed.    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 Healing well.       F/U:   - 3 months for follow up visit, will ensure continued stomal healing and patency at that time.

## 2020-08-03 NOTE — NURSING NOTE
Chief Complaint   Patient presents with     Surgical Followup     URETHROSTOMY       There were no vitals taken for this visit. There is no height or weight on file to calculate BMI.    Patient Active Problem List   Diagnosis     Congenital stricture of urethra       Allergies   Allergen Reactions     Lidocaine Hives     Ketoconazole      Liver shut down        Current Outpatient Medications   Medication Sig Dispense Refill     levothyroxine (SYNTHROID/LEVOTHROID) 137 MCG tablet TAKE 1 TAB BY MOUTH EVERYMORNING BEFORE BREAKFAST.       Omega-3 Fatty Acids (FISH OIL PO) Take 4,000 mg by mouth       oxyCODONE (ROXICODONE) 5 MG tablet Take 1 tablet (5 mg) by mouth every 6 hours as needed for moderate to severe pain 12 tablet 0       Social History     Tobacco Use     Smoking status: Former Smoker     Types: Cigarettes     Smokeless tobacco: Never Used   Substance Use Topics     Alcohol use: None     Drug use: None       Alex Irby, EMT  8/3/2020  1:23 PM

## 2020-08-03 NOTE — PATIENT INSTRUCTIONS
Please follow up in 3 month     It was a pleasure meeting with you today.  Thank you for allowing me and my team the privilege of caring for you today.  YOU are the reason we are here, and I truly hope we provided you with the excellent service you deserve.  Please let us know if there is anything else we can do for you so that we can be sure you are leaving completely satisfied with your care experience.

## 2020-08-03 NOTE — LETTER
8/3/2020       RE: Vamshi Ríos  [address] [facility] [address]     Dear Colleague,    Thank you for referring your patient, Vamshi íRos, to the [facility] UROLOGY AND [facility] FOR PROSTATE AND UROLOGIC CANCERS at [facility]. Please see a copy of my visit note below.    Clinic Visit Note    Vamshi Ríos is a 54-year-old man with a previous hypospadias repair and then 2 stage urethroplasty and now recurrent stricture. He was on self-dilation but strictured down with urinary retention requiring SPT. 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 He presents today for post-op follow up. He has been doing well since the surgery. Does note voiding frequency since surgery which seems to be improving. No dysuria, fevers, chills or suprapubic pain. He has noticed some drops of blood occasionally when wiping. No other issues when urinating. Pain is well controlled. He is having normal BMs.       Vital signs reviewed    Exam:  No tenderness or evidence of cellulitis  No hematoma  Perineum urostomy well healing, with 2mm areas at 2 and 5 oclock with skin dehiscence from stoma.   SPT site well healed.    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 Healing well.       F/U:   - 3 months for follow up visit, will ensure continued stomal healing and patency at that time.

## 2020-10-19 ENCOUNTER — PRE VISIT (OUTPATIENT)
Dept: UROLOGY | Facility: CLINIC | Age: 55
End: 2020-10-19

## 2020-10-19 NOTE — TELEPHONE ENCOUNTER
Chief Complaint : Follow up - 3 Months    Hx/Sx: Urethral stricture (s/p perineal urethrostomy), Hypospadias repair, s/p 2 stage urethroplasty    Records/Orders: Available    Pt Contacted: N/a    At Rooming: Flow/PVR, USSIM, undress for exam    Alex Irby, EMT

## 2020-11-16 ENCOUNTER — OFFICE VISIT (OUTPATIENT)
Dept: UROLOGY | Facility: CLINIC | Age: 55
End: 2020-11-16
Payer: COMMERCIAL

## 2020-11-16 VITALS — DIASTOLIC BLOOD PRESSURE: 94 MMHG | SYSTOLIC BLOOD PRESSURE: 158 MMHG | HEART RATE: 87 BPM

## 2020-11-16 DIAGNOSIS — Q64.32 CONGENITAL STRICTURE OF URETHRA: Primary | ICD-10-CM

## 2020-11-16 PROCEDURE — 99213 OFFICE O/P EST LOW 20 MIN: CPT | Performed by: STUDENT IN AN ORGANIZED HEALTH CARE EDUCATION/TRAINING PROGRAM

## 2020-11-16 ASSESSMENT — PAIN SCALES - GENERAL: PAINLEVEL: NO PAIN (0)

## 2020-11-16 NOTE — LETTER
11/16/2020       RE: Vamshi Ríos  [address] [facility] [address]     Dear Colleague,    Thank you for referring your patient, Vamshi Ríos, to the [facility] UROLOGY CLINIC [place] at [facility]. Please see a copy of my visit note below.    Reason for visit:  Follow up visit     HPI: Vamshi Ríos is a 54-year-old man with a previous hypospadias repair and then 2 stage urethroplasty with complete obstruction at the penoscrotal junction and urinary retention requiring catheterization. He is now s/p perineal urethrostomy on 7/24/2020. He presents today for follow up. He has been doing well since the surgery.   Reports urgency has improved since surgery, can now go several hours without feeling urge to void  Urine does come out in a sprinkler-like fashion. He sits to void.  No UTIs or other issues since visit.   PVR 0 today (voided prior to rooming).    Current Outpatient Medications   Medication Sig Dispense Refill     levothyroxine (SYNTHROID/LEVOTHROID) 137 MCG tablet TAKE 1 TAB BY MOUTH EVERYMORNING BEFORE BREAKFAST.       Omega-3 Fatty Acids (FISH OIL PO) Take 4,000 mg by mouth       oxyCODONE (ROXICODONE) 5 MG tablet Take 1 tablet (5 mg) by mouth every 6 hours as needed for moderate to severe pain 12 tablet 0     Allergies   Allergen Reactions     Lidocaine Hives     Ketoconazole      Liver shut down      Past Medical History:   Diagnosis Date     MARYANA (obstructive sleep apnea)      Past Surgical History:   Procedure Laterality Date     URETHROSTOMY PERINEUM N/A 7/24/2020    Procedure: CREATION, URETHROSTOMY, PERINEAL;  Surgeon: Mateo Crouch MD;  Location:  OR     [facility] - see hpi otherwise rest is negative     OBJECTIVE:  There were no vitals filed for this visit.  Constitutional: healthy, alert and no distress   Respiratory: normal work of breathing  Psychiatric: mentation appears normal and affect normal/bright  Head: Normocephalic  Abdomen:  Abdomen soft, non-tender.   : PU patient, edges of stoma well-healed. 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 Doing well  - RTC 1 year after surgery  - Earlier if any issues    Slim Patterson MD  Reconstructive Urology Fellow

## 2020-11-16 NOTE — PROGRESS NOTES
Reason for visit:  Follow up visit     HPI: Vamshi Ríos is a 54-year-old man with a previous hypospadias repair and then 2 stage urethroplasty with complete obstruction at the penoscrotal junction and urinary retention requiring catheterization. He is now s/p perineal urethrostomy on 7/24/2020. He presents today for follow up. He has been doing well since the surgery.   Reports urgency has improved since surgery, can now go several hours without feeling urge to void  Urine does come out in a sprinkler-like fashion. He sits to void.  No UTIs or other issues since visit.   PVR 0 today (voided prior to rooming).    Current Outpatient Medications   Medication Sig Dispense Refill     levothyroxine (SYNTHROID/LEVOTHROID) 137 MCG tablet TAKE 1 TAB BY MOUTH EVERYMORNING BEFORE BREAKFAST.       Omega-3 Fatty Acids (FISH OIL PO) Take 4,000 mg by mouth       oxyCODONE (ROXICODONE) 5 MG tablet Take 1 tablet (5 mg) by mouth every 6 hours as needed for moderate to severe pain 12 tablet 0     Allergies   Allergen Reactions     Lidocaine Hives     Ketoconazole      Liver shut down      Past Medical History:   Diagnosis Date     MARYANA (obstructive sleep apnea)      Past Surgical History:   Procedure Laterality Date     URETHROSTOMY PERINEUM N/A 7/24/2020    Procedure: CREATION, URETHROSTOMY, PERINEAL;  Surgeon: Mateo Crouch MD;  Location:  OR     [facility] - see hpi otherwise rest is negative     OBJECTIVE:  There were no vitals filed for this visit.  Constitutional: healthy, alert and no distress   Respiratory: normal work of breathing  Psychiatric: mentation appears normal and affect normal/bright  Head: Normocephalic  Abdomen: Abdomen soft, non-tender.   : PU patient, edges of stoma well-healed. Bougied 18 > 24Fr without issue  SKIN: Soft, dry    Assessment/Plan: Vamshi Ríos is a 54-year-old man with a previous hypospadias repair and then 2 stage urethroplasty and now recurrent stricture and a complete obstruction  of the urethra at the penoscotal junction now 4 months s/p perineal urethrostomy. Doing well  - RTC 1 year after surgery  - Earlier if any issues    Slim Patterson MD  Reconstructive Urology Fellow

## 2020-11-16 NOTE — NURSING NOTE
Chief Complaint   Patient presents with     Follow Up     Urethral stricture        Blood pressure (!) 158/94, pulse 87. There is no height or weight on file to calculate BMI.    Patient Active Problem List   Diagnosis     Congenital stricture of urethra       Allergies   Allergen Reactions     Lidocaine Hives     Ketoconazole      Liver shut down        Current Outpatient Medications   Medication Sig Dispense Refill     levothyroxine (SYNTHROID/LEVOTHROID) 137 MCG tablet TAKE 1 TAB BY MOUTH EVERYMORNING BEFORE BREAKFAST.       Omega-3 Fatty Acids (FISH OIL PO) Take 4,000 mg by mouth       oxyCODONE (ROXICODONE) 5 MG tablet Take 1 tablet (5 mg) by mouth every 6 hours as needed for moderate to severe pain 12 tablet 0       Social History     Tobacco Use     Smoking status: Former Smoker     Types: Cigarettes     Smokeless tobacco: Never Used   Substance Use Topics     Alcohol use: None     Drug use: None       Alex Irby, EMT  11/16/2020  3:39 PM

## 2021-01-04 ENCOUNTER — HEALTH MAINTENANCE LETTER (OUTPATIENT)
Age: 56
End: 2021-01-04

## 2021-08-25 NOTE — TELEPHONE ENCOUNTER
Patient was notified that 7/1/2020 would be to soon to have his COVID 19 testing done. Patient was notified that his COVID 19 testing needs to within 72 hours of his surgery. Patient agreed with the plan.      Shekhar Ochoa MA    Breath sounds clear and equal bilaterally.

## 2021-09-14 ENCOUNTER — PRE VISIT (OUTPATIENT)
Dept: UROLOGY | Facility: CLINIC | Age: 56
End: 2021-09-14

## 2021-09-14 NOTE — TELEPHONE ENCOUNTER
Reason for visit: one year follow up     Dx/Hx/Sx: s/p urethral stricture    Records/imaging/labs/orders: NA    At Rooming: flow/pvr ussim questionnaire

## 2021-09-20 ENCOUNTER — OFFICE VISIT (OUTPATIENT)
Dept: UROLOGY | Facility: CLINIC | Age: 56
End: 2021-09-20
Payer: COMMERCIAL

## 2021-09-20 VITALS
DIASTOLIC BLOOD PRESSURE: 87 MMHG | BODY MASS INDEX: 32.58 KG/M2 | WEIGHT: 215 LBS | HEIGHT: 68 IN | SYSTOLIC BLOOD PRESSURE: 134 MMHG | HEART RATE: 75 BPM

## 2021-09-20 DIAGNOSIS — N35.914 ANTERIOR URETHRAL STRICTURE: Primary | ICD-10-CM

## 2021-09-20 PROCEDURE — 99213 OFFICE O/P EST LOW 20 MIN: CPT | Performed by: STUDENT IN AN ORGANIZED HEALTH CARE EDUCATION/TRAINING PROGRAM

## 2021-09-20 ASSESSMENT — MIFFLIN-ST. JEOR: SCORE: 1789.73

## 2021-09-20 ASSESSMENT — PAIN SCALES - GENERAL: PAINLEVEL: NO PAIN (0)

## 2021-09-20 NOTE — LETTER
"9/20/2021       RE: Vamshi Ríos  [address] [facility] [address]     Dear Colleague,    Thank you for referring your patient, Vamshi Ríos, to the [facility] UROLOGY CLINIC [place] at [facility]. Please see a copy of my visit note below.    HPI:  Vamshi Ríos is a 55 year old male with a history of hypospadias sp mult repairs w complete obstruction at penoscrotal junction now s/p perineal urethrostomy July 2020. He presents today for one year follow up.     He reports he is doing well. When bladder is full he voids great. When his bladder is not full his flow is sometimes slower and sprays. No UTIs, no ED. Feels he empties well.     Voiding right before his visit today so does not feel urge to go. Will drink water and try to give us a uroflow before he leaves.       Exam:  /87   Pulse 75   Ht 1.727 m (5' 8\")   Wt 97.5 kg (215 lb)   BMI 32.69 kg/m    NAD  WWP   Nl wob on RA  Belly soft  Perineal urethrostomy well healed  Patent  Lubricated bougies passed 18 --> 24, widely patent  No leakage      Review of Labs:  The following labs were reviewed by me and discussed with the patient:  No results found for this or any previous visit (from the past 720 hour(s)).      Assessment & Plan   55 M w hypospadias w mult prior repairs w recurrent stricture now one year out from perineal urethrostomy  Doing well  Unable to give uroflow today as he just voided  Will hydrate and try to give a uroflow before he leaves, will caveat that his stream is not very strong when his bladder is not full  Return to care ZEB Quintero MD  [facility] UROLOGY CLINIC [place]      ==========================    "

## 2021-09-20 NOTE — NURSING NOTE
"Chief Complaint   Patient presents with     Follow Up     s/p urethral stricture       Blood pressure 134/87, pulse 75, height 1.727 m (5' 8\"), weight 97.5 kg (215 lb). Body mass index is 32.69 kg/m .    Patient Active Problem List   Diagnosis     Congenital stricture of urethra       Allergies   Allergen Reactions     Lidocaine Hives     Ketoconazole      Liver shut down        Current Outpatient Medications   Medication Sig Dispense Refill     levothyroxine (SYNTHROID/LEVOTHROID) 137 MCG tablet TAKE 1 TAB BY MOUTH EVERYMORNING BEFORE BREAKFAST.       Omega-3 Fatty Acids (FISH OIL PO) Take 4,000 mg by mouth         Social History     Tobacco Use     Smoking status: Former Smoker     Types: Cigarettes     Smokeless tobacco: Never Used   Substance Use Topics     Alcohol use: None     Drug use: None       Jean Valdes  9/20/2021  3:27 PM  "

## 2021-09-20 NOTE — PROGRESS NOTES
"HPI:  Vamshi Ríos is a 55 year old male with a history of hypospadias sp mult repairs w complete obstruction at penoscrotal junction now s/p perineal urethrostomy July 2020. He presents today for one year follow up.     He reports he is doing well. When bladder is full he voids great. When his bladder is not full his flow is sometimes slower and sprays. No UTIs, no ED. Feels he empties well.     Voiding right before his visit today so does not feel urge to go. Will drink water and try to give us a uroflow before he leaves.       Exam:  /87   Pulse 75   Ht 1.727 m (5' 8\")   Wt 97.5 kg (215 lb)   BMI 32.69 kg/m    NAD  WWP   Nl wob on RA  Belly soft  Perineal urethrostomy well healed  Patent  Lubricated bougies passed 18 --> 24, widely patent  No leakage      Review of Labs:  The following labs were reviewed by me and discussed with the patient:  No results found for this or any previous visit (from the past 720 hour(s)).      Assessment & Plan   55 M w hypospadias w mult prior repairs w recurrent stricture now one year out from perineal urethrostomy  Doing well  Unable to give uroflow today as he just voided  Will hydrate and try to give a uroflow before he leaves, will caveat that his stream is not very strong when his bladder is not full  Return to care ZEB Quintero MD  [facility] UROLOGY CLINIC [place]      ==========================    "

## 2021-10-10 ENCOUNTER — HEALTH MAINTENANCE LETTER (OUTPATIENT)
Age: 56
End: 2021-10-10

## 2022-01-17 NOTE — TELEPHONE ENCOUNTER
[facility] Call Center    Phone Message    May a detailed message be left on voicemail: yes     Reason for Call: Other: Pt would like some information about the requirements/ instructions for him to complete his labs and then his appt.  His understanding is that for his lab his bladder will be filled with contast agent and water but it will be drained throught the catheter afterwards and then he goes directly to his exam where the Doctor wants him to have a full bladder.  Pt believes that both may not be possible in the time a lotted to him.  Please call to discuss today since labs are on Monday.     Action Taken: Message routed to:  [facility] urology    Travel Screening: Not Applicable                                                                         I called [facility] and spoke with Suzy and confirmed exactly what was needed and resent Rx's

## 2022-01-30 ENCOUNTER — HEALTH MAINTENANCE LETTER (OUTPATIENT)
Age: 57
End: 2022-01-30

## 2022-09-24 ENCOUNTER — HEALTH MAINTENANCE LETTER (OUTPATIENT)
Age: 57
End: 2022-09-24

## 2023-05-08 ENCOUNTER — HEALTH MAINTENANCE LETTER (OUTPATIENT)
Age: 58
End: 2023-05-08

## 2024-07-14 ENCOUNTER — HEALTH MAINTENANCE LETTER (OUTPATIENT)
Age: 59
End: 2024-07-14

## (undated) DEVICE — SOL WATER IRRIG 1000ML BOTTLE 2F7114

## (undated) DEVICE — PREP POVIDONE IODINE SOLUTION 10% 120ML

## (undated) DEVICE — ENDO SEAL BX PORT BPS-A

## (undated) DEVICE — ESU GROUND PAD ADULT W/CORD E7507

## (undated) DEVICE — PAD CHUX UNDERPAD 30X30"

## (undated) DEVICE — SOL NACL 0.9% IRRIG 1000ML BOTTLE 2F7124

## (undated) DEVICE — SYR BULB IRRIG 50ML LATEX FREE 0035280

## (undated) DEVICE — BLADE CLIPPER SGL USE 9680

## (undated) DEVICE — SU VICRYL 4-0 RB-1 27" J304

## (undated) DEVICE — LINEN GOWN XLG 5407

## (undated) DEVICE — PACK ENT MINOR CUSTOM ASC

## (undated) DEVICE — SU MONOCRYL 4-0 PS-2 27" UND Y426H

## (undated) DEVICE — SUCTION MANIFOLD NEPTUNE 2 SYS 4 PORT 0702-020-000

## (undated) DEVICE — PREP SKIN SCRUB TRAY 4461A

## (undated) DEVICE — SUTURE BOOTS 051003PBX

## (undated) DEVICE — TUBING SUCTION 12"X1/4" N612

## (undated) DEVICE — LINEN TOWEL PACK X5 5464

## (undated) DEVICE — DRAPE GYN/UROLOGY FLUID POUCH TUR 29455

## (undated) DEVICE — DRAPE LEGGINGS CLEAR 8430

## (undated) DEVICE — LINEN TOWEL PACK X6 WHITE 5487

## (undated) DEVICE — PANTIES MESH LG/XLG 2PK 706M2

## (undated) DEVICE — BAG URINARY DRAIN LUBRISIL IC 4000ML LF 253509A

## (undated) DEVICE — TUBING IRRIG CYSTO/BLADDER SET 81" LF 2C4040

## (undated) DEVICE — JELLY LUBRICATING SURGILUBE 2OZ TUBE

## (undated) DEVICE — DRAPE POUCH INSTRUMENT 1018

## (undated) DEVICE — PEN MARKING SKIN FINE 31145942

## (undated) DEVICE — SOL NACL 0.9% INJ 1000ML BAG 2B1324X

## (undated) DEVICE — PREP POVIDONE IODINE SCRUB 7.5% 120ML

## (undated) DEVICE — RX BACITRACIN OINTMENT 0.9G 1/32OZ CUR001109

## (undated) RX ORDER — PROPOFOL 10 MG/ML
INJECTION, EMULSION INTRAVENOUS
Status: DISPENSED
Start: 2020-07-24

## (undated) RX ORDER — ACETAMINOPHEN 325 MG/1
TABLET ORAL
Status: DISPENSED
Start: 2020-07-24

## (undated) RX ORDER — FENTANYL CITRATE 50 UG/ML
INJECTION, SOLUTION INTRAMUSCULAR; INTRAVENOUS
Status: DISPENSED
Start: 2020-07-24

## (undated) RX ORDER — FENTANYL CITRATE-0.9 % NACL/PF 10 MCG/ML
PLASTIC BAG, INJECTION (ML) INTRAVENOUS
Status: DISPENSED
Start: 2020-07-24

## (undated) RX ORDER — GABAPENTIN 300 MG/1
CAPSULE ORAL
Status: DISPENSED
Start: 2020-07-24

## (undated) RX ORDER — HYDROMORPHONE HYDROCHLORIDE 1 MG/ML
INJECTION, SOLUTION INTRAMUSCULAR; INTRAVENOUS; SUBCUTANEOUS
Status: DISPENSED
Start: 2020-07-24

## (undated) RX ORDER — CIPROFLOXACIN 500 MG/1
TABLET, FILM COATED ORAL
Status: DISPENSED
Start: 2020-06-22

## (undated) RX ORDER — LIDOCAINE HYDROCHLORIDE 20 MG/ML
INJECTION, SOLUTION EPIDURAL; INFILTRATION; INTRACAUDAL; PERINEURAL
Status: DISPENSED
Start: 2020-07-24

## (undated) RX ORDER — ONDANSETRON 2 MG/ML
INJECTION INTRAMUSCULAR; INTRAVENOUS
Status: DISPENSED
Start: 2020-07-24

## (undated) RX ORDER — DEXAMETHASONE SODIUM PHOSPHATE 4 MG/ML
INJECTION, SOLUTION INTRA-ARTICULAR; INTRALESIONAL; INTRAMUSCULAR; INTRAVENOUS; SOFT TISSUE
Status: DISPENSED
Start: 2020-07-24

## (undated) RX ORDER — CEFAZOLIN SODIUM 1 G/3ML
INJECTION, POWDER, FOR SOLUTION INTRAMUSCULAR; INTRAVENOUS
Status: DISPENSED
Start: 2020-07-24